# Patient Record
Sex: FEMALE | Race: BLACK OR AFRICAN AMERICAN | Employment: FULL TIME | URBAN - METROPOLITAN AREA
[De-identification: names, ages, dates, MRNs, and addresses within clinical notes are randomized per-mention and may not be internally consistent; named-entity substitution may affect disease eponyms.]

---

## 2019-02-25 ENCOUNTER — APPOINTMENT (OUTPATIENT)
Dept: GENERAL RADIOLOGY | Age: 44
End: 2019-02-25
Attending: EMERGENCY MEDICINE
Payer: MEDICAID

## 2019-02-25 ENCOUNTER — HOSPITAL ENCOUNTER (EMERGENCY)
Age: 44
Discharge: HOME | End: 2019-02-25
Attending: EMERGENCY MEDICINE
Payer: MEDICAID

## 2019-02-25 VITALS
WEIGHT: 147 LBS | TEMPERATURE: 98.7 F | HEIGHT: 63 IN | SYSTOLIC BLOOD PRESSURE: 131 MMHG | DIASTOLIC BLOOD PRESSURE: 71 MMHG | OXYGEN SATURATION: 100 % | BODY MASS INDEX: 26.05 KG/M2 | RESPIRATION RATE: 16 BRPM | HEART RATE: 63 BPM

## 2019-02-25 DIAGNOSIS — R07.9 CHEST PAIN, UNSPECIFIED TYPE: Primary | ICD-10-CM

## 2019-02-25 LAB
ALBUMIN SERPL-MCNC: 4.3 G/DL (ref 3.5–5)
ALBUMIN/GLOB SERPL: 1.1 {RATIO}
ALP SERPL-CCNC: 48 U/L (ref 50–130)
ALT SERPL-CCNC: 17 U/L (ref 12–65)
ANION GAP SERPL CALC-SCNC: 7 MMOL/L
AST SERPL-CCNC: 15 U/L (ref 15–37)
ATRIAL RATE: 57 BPM
ATRIAL RATE: 75 BPM
BASOPHILS # BLD: 0 K/UL (ref 0–0.2)
BASOPHILS NFR BLD: 1 % (ref 0–2)
BILIRUB SERPL-MCNC: 0.6 MG/DL (ref 0.2–1.1)
BUN SERPL-MCNC: 9 MG/DL (ref 6–23)
CALCIUM SERPL-MCNC: 8.9 MG/DL (ref 8.3–10.4)
CALCULATED P AXIS, ECG09: 45 DEGREES
CALCULATED P AXIS, ECG09: 59 DEGREES
CALCULATED R AXIS, ECG10: 49 DEGREES
CALCULATED R AXIS, ECG10: 53 DEGREES
CALCULATED T AXIS, ECG11: 32 DEGREES
CALCULATED T AXIS, ECG11: 44 DEGREES
CHLORIDE SERPL-SCNC: 107 MMOL/L (ref 98–107)
CO2 SERPL-SCNC: 27 MMOL/L (ref 21–32)
CREAT SERPL-MCNC: 0.77 MG/DL (ref 0.6–1)
D DIMER PPP FEU-MCNC: 0.45 UG/ML(FEU)
DIAGNOSIS, 93000: NORMAL
DIAGNOSIS, 93000: NORMAL
DIFFERENTIAL METHOD BLD: NORMAL
EOSINOPHIL # BLD: 0.1 K/UL (ref 0–0.8)
EOSINOPHIL NFR BLD: 1 % (ref 0.5–7.8)
ERYTHROCYTE [DISTWIDTH] IN BLOOD BY AUTOMATED COUNT: 12.4 % (ref 11.9–14.6)
GLOBULIN SER CALC-MCNC: 3.9 G/DL (ref 2.3–3.5)
GLUCOSE SERPL-MCNC: 84 MG/DL (ref 65–100)
HCT VFR BLD AUTO: 40.8 % (ref 35.8–46.3)
HGB BLD-MCNC: 13 G/DL (ref 11.7–15.4)
IMM GRANULOCYTES # BLD AUTO: 0 K/UL (ref 0–0.5)
IMM GRANULOCYTES NFR BLD AUTO: 0 % (ref 0–5)
LYMPHOCYTES # BLD: 2.2 K/UL (ref 0.5–4.6)
LYMPHOCYTES NFR BLD: 32 % (ref 13–44)
MCH RBC QN AUTO: 30.3 PG (ref 26.1–32.9)
MCHC RBC AUTO-ENTMCNC: 31.9 G/DL (ref 31.4–35)
MCV RBC AUTO: 95.1 FL (ref 79.6–97.8)
MONOCYTES # BLD: 0.6 K/UL (ref 0.1–1.3)
MONOCYTES NFR BLD: 8 % (ref 4–12)
NEUTS SEG # BLD: 3.9 K/UL (ref 1.7–8.2)
NEUTS SEG NFR BLD: 58 % (ref 43–78)
NRBC # BLD: 0 K/UL (ref 0–0.2)
P-R INTERVAL, ECG05: 144 MS
P-R INTERVAL, ECG05: 156 MS
PLATELET # BLD AUTO: 246 K/UL (ref 150–450)
PMV BLD AUTO: 11.2 FL (ref 9.4–12.3)
POTASSIUM SERPL-SCNC: 3.7 MMOL/L (ref 3.5–5.1)
PROT SERPL-MCNC: 8.2 G/DL
Q-T INTERVAL, ECG07: 398 MS
Q-T INTERVAL, ECG07: 448 MS
QRS DURATION, ECG06: 84 MS
QRS DURATION, ECG06: 86 MS
QTC CALCULATION (BEZET), ECG08: 436 MS
QTC CALCULATION (BEZET), ECG08: 444 MS
RBC # BLD AUTO: 4.29 M/UL (ref 4.05–5.2)
SODIUM SERPL-SCNC: 141 MMOL/L (ref 136–145)
TROPONIN I BLD-MCNC: 0 NG/ML (ref 0.02–0.05)
TROPONIN I SERPL-MCNC: <0.02 NG/ML (ref 0.02–0.05)
VENTRICULAR RATE, ECG03: 57 BPM
VENTRICULAR RATE, ECG03: 75 BPM
WBC # BLD AUTO: 6.7 K/UL (ref 4.3–11.1)

## 2019-02-25 PROCEDURE — 85379 FIBRIN DEGRADATION QUANT: CPT

## 2019-02-25 PROCEDURE — 99284 EMERGENCY DEPT VISIT MOD MDM: CPT | Performed by: EMERGENCY MEDICINE

## 2019-02-25 PROCEDURE — 93005 ELECTROCARDIOGRAM TRACING: CPT | Performed by: EMERGENCY MEDICINE

## 2019-02-25 PROCEDURE — 71046 X-RAY EXAM CHEST 2 VIEWS: CPT

## 2019-02-25 PROCEDURE — 85025 COMPLETE CBC W/AUTO DIFF WBC: CPT

## 2019-02-25 PROCEDURE — 80053 COMPREHEN METABOLIC PANEL: CPT

## 2019-02-25 PROCEDURE — 84484 ASSAY OF TROPONIN QUANT: CPT

## 2019-02-25 RX ORDER — MELATONIN
DAILY
COMMUNITY

## 2019-02-25 RX ORDER — GUAIFENESIN 100 MG/5ML
324 LIQUID (ML) ORAL
Status: DISCONTINUED | OUTPATIENT
Start: 2019-02-25 | End: 2019-02-25 | Stop reason: HOSPADM

## 2019-02-25 NOTE — ED TRIAGE NOTES
Pt reports mid/left sided chest pain for a few weeks. Pt reports pain has been intermittent and becomes worse when she lays down. Pt reports intermittent nausea.  
 
Fariha Johnston RN

## 2019-02-25 NOTE — DISCHARGE INSTRUCTIONS
Follow-up with primary care doctor and cardiologist in 24-48 hours. Return to ED if symptoms worsen or progress in any way. Chest Pain: Care Instructions  Your Care Instructions    There are many things that can cause chest pain. Some are not serious and will get better on their own in a few days. But some kinds of chest pain need more testing and treatment. Your doctor may have recommended a follow-up visit in the next 8 to 12 hours. If you are not getting better, you may need more tests or treatment. Even though your doctor has released you, you still need to watch for any problems. The doctor carefully checked you, but sometimes problems can develop later. If you have new symptoms or if your symptoms do not get better, get medical care right away. If you have worse or different chest pain or pressure that lasts more than 5 minutes or you passed out (lost consciousness), call 911 or seek other emergency help right away. A medical visit is only one step in your treatment. Even if you feel better, you still need to do what your doctor recommends, such as going to all suggested follow-up appointments and taking medicines exactly as directed. This will help you recover and help prevent future problems. How can you care for yourself at home? · Rest until you feel better. · Take your medicine exactly as prescribed. Call your doctor if you think you are having a problem with your medicine. · Do not drive after taking a prescription pain medicine. When should you call for help? Call 911 if:    · You passed out (lost consciousness).     · You have severe difficulty breathing.     · You have symptoms of a heart attack. These may include:  ? Chest pain or pressure, or a strange feeling in your chest.  ? Sweating. ? Shortness of breath. ? Nausea or vomiting. ? Pain, pressure, or a strange feeling in your back, neck, jaw, or upper belly or in one or both shoulders or arms.   ? Lightheadedness or sudden weakness. ? A fast or irregular heartbeat. After you call 911, the  may tell you to chew 1 adult-strength or 2 to 4 low-dose aspirin. Wait for an ambulance. Do not try to drive yourself.    Call your doctor today if:    · You have any trouble breathing.     · Your chest pain gets worse.     · You are dizzy or lightheaded, or you feel like you may faint.     · You are not getting better as expected.     · You are having new or different chest pain. Where can you learn more? Go to http://harleen-song.info/. Enter A120 in the search box to learn more about \"Chest Pain: Care Instructions. \"  Current as of: September 23, 2018  Content Version: 11.9  © 7631-3893 [employer]. Care instructions adapted under license by [employer] (which disclaims liability or warranty for this information). If you have questions about a medical condition or this instruction, always ask your healthcare professional. [place] [address] any warranty or liability for your use of this information. Patient Education        Musculoskeletal Chest Pain: Care Instructions  Your Care Instructions    Chest pain is not always a sign that something is wrong with your heart or that you have another serious problem. The doctor thinks your chest pain is caused by strained muscles or ligaments, inflamed chest cartilage, or another problem in your chest, rather than by your heart. You may need more tests to find the cause of your chest pain. Follow-up care is a key part of your treatment and safety. Be sure to make and go to all appointments, and call your doctor if you are having problems. It's also a good idea to know your test results and keep a list of the medicines you take. How can you care for yourself at home? · Take pain medicines exactly as directed. ? If the doctor gave you a prescription medicine for pain, take it as prescribed.   ? If you are not taking a prescription pain medicine, ask your doctor if you can take an over-the-counter medicine. · Rest and protect the sore area. · Stop, change, or take a break from any activity that may be causing your pain or soreness. · Put ice or a cold pack on the sore area for 10 to 20 minutes at a time. Try to do this every 1 to 2 hours for the next 3 days (when you are awake) or until the swelling goes down. Put a thin cloth between the ice and your skin. · After 2 or 3 days, apply a heating pad set on low or a warm cloth to the area that hurts. Some doctors suggest that you go back and forth between hot and cold. · Do not wrap or tape your ribs for support. This may cause you to take smaller breaths, which could increase your risk of lung problems. · Mentholated creams such as Bengay or Icy Hot may soothe sore muscles. Follow the instructions on the package. · Follow your doctor's instructions for exercising. · Gentle stretching and massage may help you get better faster. Stretch slowly to the point just before pain begins, and hold the stretch for at least 15 to 30 seconds. Do this 3 or 4 times a day. Stretch just after you have applied heat. · As your pain gets better, slowly return to your normal activities. Any increased pain may be a sign that you need to rest a while longer. When should you call for help? Call 911 anytime you think you may need emergency care. For example, call if:    · You have chest pain or pressure. This may occur with:  ? Sweating. ? Shortness of breath. ? Nausea or vomiting. ? Pain that spreads from the chest to the neck, jaw, or one or both shoulders or arms. ? Dizziness or lightheadedness. ? A fast or uneven pulse. After calling 911, chew 1 adult-strength aspirin. Wait for an ambulance.  Do not try to drive yourself.     · You have sudden chest pain and shortness of breath, or you cough up blood.    Call your doctor now or seek immediate medical care if:    · You have any trouble breathing.     · Your chest pain gets worse.     · Your chest pain occurs consistently with exercise and is relieved by rest.    Watch closely for changes in your health, and be sure to contact your doctor if:    · Your chest pain does not get better after 1 week. Where can you learn more? Go to http://harleen-osng.info/. Enter V293 in the search box to learn more about \"Musculoskeletal Chest Pain: Care Instructions. \"  Current as of: September 23, 2018  Content Version: 11.9  © 1423-6996 [employer]. Care instructions adapted under license by [employer] (which disclaims liability or warranty for this information). If you have questions about a medical condition or this instruction, always ask your healthcare professional. [address] any warranty or liability for your use of this information.

## 2019-02-25 NOTE — ED NOTES
I have reviewed discharge instructions with the patient. The patient verbalized understanding. Patient left ED via Discharge Method: ambulatory to Home with (insert name of family/friend, self, transport ). Opportunity for questions and clarification provided. Patient given 0 scripts. To continue your aftercare when you leave the hospital, you may receive an automated call from our care team to check in on how you are doing. This is a free service and part of our promise to provide the best care and service to meet your aftercare needs.  If you have questions, or wish to unsubscribe from this service please call [phone]. Thank you for Choosing our [facility].

## 2019-02-25 NOTE — PROGRESS NOTES
Visited with patient at request of Dr Hanna Dimas to assist with locating a PCP. Patient relocated to [place] from [place] in October 2018. Provided patient with a list of PCPs. States she would like to take it home and look it over and will call me if she has any difficulty getting an appointment. My contact information was provided to patient and she was strongly encouraged to get follow up appointment as soon as possible and call me if any assistance is needed.

## 2019-02-25 NOTE — ED PROVIDER NOTES
75-year-old female presents with complaint of left-sided sharp chest discomfort this been present over the past several weeks. Rates pain is constant. States that it does not radiate. Denies shortness of breath, hemoptysis, cough, leg swelling, fever, chills, dizziness, diaphoresis, abdominal pain, nausea, vomiting. Denies family history of CAD. Denies any personal history of CAD, PE, DVT. States she underwent a stress test about a year ago that was completely normal. 
 
 
The history is provided by the patient. No  was used. Chest Pain (Angina) This is a recurrent problem. The current episode started more than 1 week ago. The pain is associated with rest. The pain is present in the substernal region and left side. The pain is at a severity of 2/10. The pain is mild. The quality of the pain is described as sharp and pressure-like. The pain does not radiate. Pertinent negatives include no abdominal pain, no back pain, no claudication, no cough, no diaphoresis, no dizziness, no exertional chest pressure, no fever, no headaches, no hemoptysis, no irregular heartbeat, no leg pain, no malaise/fatigue, no nausea, no near-syncope, no numbness, no orthopnea, no palpitations, no shortness of breath, no sputum production, no vomiting and no weakness. She has tried nothing for the symptoms. The treatment provided no relief. Risk factors include no risk factors. History reviewed. No pertinent past medical history. Past Surgical History:  
Procedure Laterality Date  HX CHOLECYSTECTOMY  HX GYN    
 hysterectomy  HX GYN    
 bladder tac History reviewed. No pertinent family history. Social History Socioeconomic History  Marital status:  Spouse name: Not on file  Number of children: Not on file  Years of education: Not on file  Highest education level: Not on file Social Needs  Financial resource strain: Not on file  Food insecurity - worry: Not on file  Food insecurity - inability: Not on file  Transportation needs - medical: Not on file  Transportation needs - non-medical: Not on file Occupational History  Not on file Tobacco Use  Smoking status: Never Smoker  Smokeless tobacco: Never Used Substance and Sexual Activity  Alcohol use: No  
  Frequency: Never  Drug use: No  
 Sexual activity: Not on file Other Topics Concern  Not on file Social History Narrative  Not on file ALLERGIES: Amoxicillin; Darvocet a500 [propoxyphene n-acetaminophen]; Morphine; and Vicodin [hydrocodone-acetaminophen] Review of Systems Constitutional: Negative for diaphoresis, fever and malaise/fatigue. Respiratory: Negative for cough, hemoptysis, sputum production and shortness of breath. Cardiovascular: Positive for chest pain. Negative for palpitations, orthopnea, claudication and near-syncope. Gastrointestinal: Negative for abdominal pain, nausea and vomiting. Musculoskeletal: Negative for back pain. Neurological: Negative for dizziness, weakness, numbness and headaches. Vitals:  
 02/25/19 1322 BP: (!) 150/95 Pulse: 63 Resp: 16 Temp: 98.7 °F (37.1 °C) SpO2: 100% Weight: 66.7 kg (147 lb) Height: 5' 2.5\" (1.588 m) Physical Exam  
Constitutional: She is oriented to person, place, and time. She appears well-developed. Well appearing and in NAD. Nontoxic in appearance. HENT:  
Head: Normocephalic. MMM. Eyes: Conjunctivae are normal.  
Neck: No JVD present. No tracheal deviation present. Cardiovascular: Normal rate, regular rhythm and normal heart sounds. RRR. Pulses 2+ and equal bilaterally. Pulmonary/Chest:  
CTAB. No wheezes, rhonchi, or rales. Abdominal: Soft. Bowel sounds are normal.  
Soft, NTND. No rebound or guarding. No CVAT. Musculoskeletal: Normal range of motion. No LE edema. No calf TTP. Neurological: She is alert and oriented to person, place, and time. No cranial nerve deficit. Strength 5/5 throughout. Normal sensory. Skin: Skin is warm and dry. No rash. Nursing note and vitals reviewed. MDM Number of Diagnoses or Management Options Chest pain, unspecified type: new and requires workup Diagnosis management comments: VSS. Physical exam normal.  Radial pulses 2+ and equal bilaterally. No concern for aortic dissection. Initial and repeat Trop negative. D-dimer normal.  
CXR clear. Pt given  mg po. She reports complete hysterectomy; states there is no way she could be pregnant. States complete resolution of chest pain. Discussed with case management the patient would need to have help with establishing a primary care doctor. Patient instructed on need for close follow-up with cardiology. Given strict return precautions. Patient in agreement with plan. Amount and/or Complexity of Data Reviewed Clinical lab tests: ordered and reviewed Tests in the radiology section of CPT®: ordered and reviewed Tests in the medicine section of CPT®: ordered and reviewed Review and summarize past medical records: yes Independent visualization of images, tracings, or specimens: yes Risk of Complications, Morbidity, and/or Mortality Presenting problems: moderate Diagnostic procedures: moderate Management options: moderate Patient Progress Patient progress: stable ED Course as of Feb 25 1546 Mon Feb 25, 2019  
1545 CXR Impression: Normal chest x-ray. [DF] ED Course User Index 
[DF] Ward Tinajero MD  
 
 
EKG Date/Time: 2/25/2019 3:46 PM 
Performed by: Ward Tinajero MD 
Authorized by: Ward Tinajero MD  
 
ECG reviewed by ED Physician in the absence of a cardiologist: yes Rate:  
  ECG rate:  75 ECG rate assessment: normal   
Rhythm:  
  Rhythm: sinus rhythm Ectopy:  
  Ectopy: none QRS:  
 QRS axis:  Normal 
  QRS intervals:  Normal 
Conduction:  
  Conduction: normal   
ST segments: ST segments:  Normal 
T waves:  
  T waves: normal   
 
EKG Date/Time: 2/25/2019 4:27 PM 
Performed by: Carlin Harris MD 
Authorized by: Carlin Harris MD  
 
ECG reviewed by ED Physician in the absence of a cardiologist: yes Rate:  
  ECG rate:  57 ECG rate assessment: normal   
Rhythm:  
  Rhythm: sinus bradycardia Ectopy:  
  Ectopy: none QRS:  
  QRS axis:  Normal 
  QRS intervals:  Normal 
Conduction:  
  Conduction: normal   
ST segments: ST segments:  Normal 
T waves:  
  T waves: normal   
 
 
 
Results Include: 
 
Recent Results (from the past 24 hour(s)) EKG, 12 LEAD, INITIAL Collection Time: 02/25/19  1:13 PM  
Result Value Ref Range Ventricular Rate 75 BPM  
 Atrial Rate 75 BPM  
 P-R Interval 144 ms QRS Duration 84 ms Q-T Interval 398 ms QTC Calculation (Bezet) 444 ms Calculated P Axis 59 degrees Calculated R Axis 49 degrees Calculated T Axis 32 degrees Diagnosis Normal sinus rhythm Possible Left atrial enlargement Borderline ECG No previous ECGs available Confirmed by VÍCTOR ISLAS (), Clara Chapa ([id number]) on 2/25/2019 3:07:07 PM 
  
CBC WITH AUTOMATED DIFF Collection Time: 02/25/19  1:28 PM  
Result Value Ref Range WBC 6.7 4.3 - 11.1 K/uL  
 RBC 4.29 4.05 - 5.2 M/uL  
 HGB 13.0 11.7 - 15.4 g/dL HCT 40.8 35.8 - 46.3 % MCV 95.1 79.6 - 97.8 FL  
 MCH 30.3 26.1 - 32.9 PG  
 MCHC 31.9 31.4 - 35.0 g/dL  
 RDW 12.4 11.9 - 14.6 % PLATELET 959 154 - 384 K/uL MPV 11.2 9.4 - 12.3 FL ABSOLUTE NRBC 0.00 0.0 - 0.2 K/uL  
 DF AUTOMATED NEUTROPHILS 58 43 - 78 % LYMPHOCYTES 32 13 - 44 % MONOCYTES 8 4.0 - 12.0 % EOSINOPHILS 1 0.5 - 7.8 % BASOPHILS 1 0.0 - 2.0 % IMMATURE GRANULOCYTES 0 0.0 - 5.0 %  
 ABS. NEUTROPHILS 3.9 1.7 - 8.2 K/UL  
 ABS. LYMPHOCYTES 2.2 0.5 - 4.6 K/UL ABS. MONOCYTES 0.6 0.1 - 1.3 K/UL  
 ABS. EOSINOPHILS 0.1 0.0 - 0.8 K/UL  
 ABS. BASOPHILS 0.0 0.0 - 0.2 K/UL  
 ABS. IMM. GRANS. 0.0 0.0 - 0.5 K/UL METABOLIC PANEL, COMPREHENSIVE Collection Time: 02/25/19  1:28 PM  
Result Value Ref Range Sodium 141 136 - 145 mmol/L Potassium 3.7 3.5 - 5.1 mmol/L Chloride 107 98 - 107 mmol/L  
 CO2 27 21 - 32 mmol/L Anion gap 7 mmol/L Glucose 84 65 - 100 mg/dL BUN 9 6 - 23 MG/DL Creatinine 0.77 0.6 - 1.0 MG/DL  
 GFR est AA >60 >60 ml/min/1.73m2 GFR est non-AA >60 ml/min/1.73m2 Calcium 8.9 8.3 - 10.4 MG/DL Bilirubin, total 0.6 0.2 - 1.1 MG/DL  
 ALT (SGPT) 17 12 - 65 U/L  
 AST (SGOT) 15 15 - 37 U/L Alk. phosphatase 48 (L) 50 - 130 U/L Protein, total 8.2 g/dL Albumin 4.3 3.5 - 5.0 g/dL Globulin 3.9 (H) 2.3 - 3.5 g/dL A-G Ratio 1.1 TROPONIN I Collection Time: 02/25/19  1:28 PM  
Result Value Ref Range Troponin-I, Qt. <0.02 (L) 0.02 - 0.05 NG/ML  
D DIMER Collection Time: 02/25/19  1:28 PM  
Result Value Ref Range D DIMER 0.45 <0.56 ug/ml(FEU) John Champion MD; 2/25/2019 @3:46 PM Voice dictation software was used during the making of this note. This software is not perfect and grammatical and other typographical errors may be present.   This note has not been proofread for errors. 
===================================================================